# Patient Record
Sex: FEMALE | Race: WHITE | NOT HISPANIC OR LATINO | URBAN - NONMETROPOLITAN AREA
[De-identification: names, ages, dates, MRNs, and addresses within clinical notes are randomized per-mention and may not be internally consistent; named-entity substitution may affect disease eponyms.]

---

## 2019-07-16 ENCOUNTER — IMPORTED ENCOUNTER (OUTPATIENT)
Dept: URBAN - NONMETROPOLITAN AREA CLINIC 1 | Facility: CLINIC | Age: 79
End: 2019-07-16

## 2019-07-16 PROBLEM — H52.223: Noted: 2019-07-16

## 2019-07-16 PROBLEM — Z96.1: Noted: 2019-07-16

## 2019-07-16 PROBLEM — H52.13: Noted: 2019-07-16

## 2019-07-16 PROCEDURE — 99203 OFFICE O/P NEW LOW 30 MIN: CPT

## 2019-07-16 NOTE — PATIENT DISCUSSION
s/p PCIOL-Stable PCIOL s/p YAG Caps OU.-Monitor. Myopia-Discussed diagnosis with patient. -Explained that people who are myopic are at a higher risk for developing RD/RT and reviewed associated S&S.-Pt to contact our office if symptoms develop. Astigmatism-Discussed diagnosis with patient.-Pt does not want glasses Rx at this time.  RTC 1 Yr CEE

## 2021-10-05 NOTE — PATIENT DISCUSSION
VISUALLY SIGNIFICANT/ DEFERS: Informed patient that their cataract is visually significant and that surgery is recommended to improve patient's visual acuity and/or glare symptoms. Patient defers surgery at this time. Will continue to monitor regularly for progression. Patient recently purchased a house and unable to do surgery at this time. Patient stated it will be about two years.

## 2021-12-01 NOTE — PATIENT DISCUSSION
CATARACT SURGERY PLANNER - STANDARD IOL/+FEMTO: Phacoemulsification with IOL: Eye: OD|DOS: 12/09/2021|Model: DIBOO|Power: 23. 0|Target: PLANO|Femto: YES|Arcs: 25° @ 90°|Visc: DUET|Omidria: YES|10% Phenylephrine: YES|Epi-shugarcaine: YES|Phaco Setting: STD|BSS+: NO|Trypan Blue: NO|CTR: NO|Olive Tip: NO|Atropine: NO|Pupilloplasty: NO|Notes: PLAN: DIBOO @ PLANO OU. HX. EARLY DRY AMD OU; RARE GUTTATA OU; SLIGHT NARROW ANGLE OU. DILATED PUPIL: 7MM. ***[place]***.

## 2021-12-09 NOTE — PATIENT DISCUSSION
CATARACT SURGERY PLANNER - STANDARD IOL/+FEMTO: Phacoemulsification with IOL: Eye: OD|DOS: 12/09/2021|Model: DIBOO|Power: 23. 0|Target: PLANO|Femto: YES|Arcs: 25° @ 90°|Visc: DUET|Omidria: YES|10% Phenylephrine: YES|Epi-shugarcaine: YES|Phaco Setting: STD|BSS+: NO|Trypan Blue: NO|CTR: NO|Olive Tip: NO|Atropine: NO|Pupilloplasty: NO|Notes: PLAN: DIBOO @ PLANO OU. HX. EARLY DRY AMD OU; RARE GUTTATA OU; SLIGHT NARROW ANGLE OU. DILATED PUPIL: 7MM. ***[place]***.

## 2021-12-15 NOTE — PATIENT DISCUSSION
CATARACT SURGERY PLANNER - STANDARD IOL/+FEMTO: Phacoemulsification with IOL: Eye: OS|DOS: 12/23/2021|Model: DIBOO|Power: 23. 5|Target: -0. 6|Femto: YES|Arcs: 25° @ 90°|Visc: DUET|Omidria: YES|10% Phenylephrine: YES|Epi-shugarcaine: YES|Phaco Setting: STD|BSS+: NO|Trypan Blue: NO|CTR: NO|Olive Tip: NO|Atropine: NO|Pupilloplasty: NO|Notes: PLAN: DIBOO @ PLANO OD; @ -0.6 OS. HX. EARLY DRY AMD OU; RARE GUTTATA OU; SLIGHT NARROW ANGLE OU. DILATED PUPIL: 7MM. ***[place]***.

## 2021-12-15 NOTE — PATIENT DISCUSSION
Discussion on off setting target more near vs distance OS. Patient elects to proceed with off setting more near OS.

## 2021-12-23 NOTE — PATIENT DISCUSSION
CATARACT SURGERY PLANNER - STANDARD IOL/+FEMTO: Phacoemulsification with IOL: Eye: OS|DOS: 12/23/2021|Model: DIBOO|Power: 23. 5|Target: -0. 6|Femto: YES|Arcs: 25° @ 90°|Visc: DUET|Omidria: YES|10% Phenylephrine: YES|Epi-shugarcaine: YES|Phaco Setting: STD|BSS+: NO|Trypan Blue: NO|CTR: NO|Olive Tip: NO|Atropine: NO|Pupilloplasty: NO|Notes: PLAN: DIBOO @ PLANO OD; @ -0.6 OS. HX. EARLY DRY AMD OU; RARE GUTTATA OU; SLIGHT NARROW ANGLE OU. DILATED PUPIL: 7MM. ***[place]***.

## 2022-01-19 NOTE — PATIENT DISCUSSION
Advised to call immediately if any worsening distortion or blurring is noted. Patient has no symptoms at this time. Patient took lostartan and BP has improved to 163-81. Patient had chills early for a short period but has subsided. No concerns expressed. Patient will be seen next week by [facility].      LOV with TB 9/17/2021- /62 (

## 2022-01-19 NOTE — PATIENT DISCUSSION
Left from last exam, pertinent information- Patient is bothered by his droopy eyelids. Stated when he gets tired his eyes begin to close making it difficult to see. Discussed with patient about holding off until after cataract surgery to have the lid surgery. When patient is ready will send for testing.

## 2022-04-09 ASSESSMENT — VISUAL ACUITY: OD_CC: 20/20-1

## 2022-04-09 ASSESSMENT — TONOMETRY
OD_IOP_MMHG: 19
OS_IOP_MMHG: 20

## 2023-04-04 ENCOUNTER — NEW PATIENT (OUTPATIENT)
Dept: RURAL CLINIC 2 | Facility: CLINIC | Age: 83
End: 2023-04-04

## 2023-04-04 DIAGNOSIS — H52.4: ICD-10-CM

## 2023-04-04 DIAGNOSIS — H16.223: ICD-10-CM

## 2023-04-04 DIAGNOSIS — H52.13: ICD-10-CM

## 2023-04-04 DIAGNOSIS — H43.813: ICD-10-CM

## 2023-04-04 DIAGNOSIS — Z96.1: ICD-10-CM

## 2023-04-04 DIAGNOSIS — H52.223: ICD-10-CM

## 2023-04-04 PROCEDURE — 92015 DETERMINE REFRACTIVE STATE: CPT

## 2023-04-04 PROCEDURE — 99204 OFFICE O/P NEW MOD 45 MIN: CPT

## 2023-04-04 PROCEDURE — 92134 CPTRZ OPH DX IMG PST SGM RTA: CPT

## 2023-04-04 ASSESSMENT — TONOMETRY
OD_IOP_MMHG: 19
OS_IOP_MMHG: 19

## 2023-04-04 ASSESSMENT — VISUAL ACUITY
OD_SC: 20/20
OS_SC: 20/200

## 2023-09-07 ENCOUNTER — EMERGENCY VISIT (OUTPATIENT)
Dept: RURAL CLINIC 2 | Facility: CLINIC | Age: 83
End: 2023-09-07

## 2023-09-07 DIAGNOSIS — H16.223: ICD-10-CM

## 2023-09-07 DIAGNOSIS — H10.211: ICD-10-CM

## 2023-09-07 DIAGNOSIS — H43.813: ICD-10-CM

## 2023-09-07 DIAGNOSIS — Z96.1: ICD-10-CM

## 2023-09-07 PROCEDURE — 99213 OFFICE O/P EST LOW 20 MIN: CPT

## 2023-09-07 RX ORDER — PREDNISOLONE ACETATE 10 MG/ML
1 SUSPENSION/ DROPS OPHTHALMIC
End: 2023-09-16

## 2023-09-07 ASSESSMENT — TONOMETRY
OS_IOP_MMHG: 18
OD_IOP_MMHG: 18

## 2023-09-07 ASSESSMENT — VISUAL ACUITY
OD_SC: 20/20
OU_SC: 20/20
OS_SC: J1+